# Patient Record
Sex: MALE | Race: WHITE | HISPANIC OR LATINO | Employment: FULL TIME | URBAN - METROPOLITAN AREA
[De-identification: names, ages, dates, MRNs, and addresses within clinical notes are randomized per-mention and may not be internally consistent; named-entity substitution may affect disease eponyms.]

---

## 2022-10-05 ENCOUNTER — HOSPITAL ENCOUNTER (EMERGENCY)
Facility: HOSPITAL | Age: 37
Discharge: HOME | End: 2022-10-05
Attending: EMERGENCY MEDICINE | Admitting: EMERGENCY MEDICINE

## 2022-10-05 ENCOUNTER — APPOINTMENT (OUTPATIENT)
Dept: GENERAL RADIOLOGY | Facility: HOSPITAL | Age: 37
End: 2022-10-05

## 2022-10-05 VITALS
TEMPERATURE: 98.9 F | OXYGEN SATURATION: 99 % | WEIGHT: 212.96 LBS | BODY MASS INDEX: 34.23 KG/M2 | HEART RATE: 85 BPM | DIASTOLIC BLOOD PRESSURE: 85 MMHG | RESPIRATION RATE: 18 BRPM | SYSTOLIC BLOOD PRESSURE: 142 MMHG | HEIGHT: 66 IN

## 2022-10-05 DIAGNOSIS — M70.22 OLECRANON BURSITIS OF LEFT ELBOW: Primary | ICD-10-CM

## 2022-10-05 DIAGNOSIS — L03.114 CELLULITIS OF LEFT ELBOW: ICD-10-CM

## 2022-10-05 PROCEDURE — 99282 EMERGENCY DEPT VISIT SF MDM: CPT

## 2022-10-05 PROCEDURE — 73080 X-RAY EXAM OF ELBOW: CPT

## 2022-10-05 RX ORDER — SULFAMETHOXAZOLE AND TRIMETHOPRIM 800; 160 MG/1; MG/1
1 TABLET ORAL 2 TIMES DAILY
Qty: 20 TABLET | Refills: 0 | Status: SHIPPED | OUTPATIENT
Start: 2022-10-05

## 2022-10-06 NOTE — ED PROVIDER NOTES
Time: 9:10 PM EDT  Arrived by: private car  Chief Complaint: Left elbow swelling  History provided by: Patient  History is limited by: N/A     History of Present Illness:  Patient is a 37 y.o. year old male who presents to the emergency department with left elbow swelling.    Patient presents to the emergency department today with complaints of left elbow pain and swelling.  Patient states it was not swollen for approximately 4 days.  Patient states he is an  and gets multiple cuts and injuries to his elbows.  Patient states he noticed that the area started become swollen and he squeezed it and got some pustular drainage along with blood.  Patient states yesterday is when he squeezed the drainage out.  Patient states he has continued to have some mild swelling and pain with palpation of the left elbow.  Patient denies any difficulty with range of motion.  Patient does state he has a history of MRSA infections and was concerned that he may have an infection of the left elbow now.      History provided by:  Patient   used: No        Similar Symptoms Previously: Yes  Recently seen: No      Patient Care Team  Primary Care Provider: Provider, No Known    Past Medical History:     No Known Allergies  History reviewed. No pertinent past medical history.  History reviewed. No pertinent surgical history.  History reviewed. No pertinent family history.    Home Medications:  Prior to Admission medications    Not on File        Social History:   Social History     Tobacco Use   • Smoking status: Never Smoker   • Smokeless tobacco: Never Used   Substance Use Topics   • Alcohol use: Not Currently   • Drug use: Never     Recent travel: no     Review of Systems:  Review of Systems   Constitutional: Negative for chills and fever.   HENT: Negative for congestion and ear pain.    Eyes: Negative for pain.   Respiratory: Negative for cough and shortness of breath.    Cardiovascular: Negative for chest  "pain.   Gastrointestinal: Negative for abdominal pain, diarrhea, nausea and vomiting.   Genitourinary: Negative for dysuria and hematuria.   Musculoskeletal: Negative for myalgias.        Left elbow pain and swelling   Skin: Negative for rash.   Neurological: Negative for dizziness and headaches.        Physical Exam:  /85 (BP Location: Right arm, Patient Position: Sitting)   Pulse 85   Temp 98.9 °F (37.2 °C) (Oral)   Resp 18   Ht 167.6 cm (66\")   Wt 96.6 kg (212 lb 15.4 oz)   SpO2 99%   BMI 34.37 kg/m²     Physical Exam  Vitals and nursing note reviewed.   Constitutional:       General: He is not in acute distress.     Appearance: Normal appearance. He is normal weight. He is not ill-appearing, toxic-appearing or diaphoretic.   HENT:      Head: Normocephalic and atraumatic.      Nose: Nose normal.   Eyes:      Extraocular Movements: Extraocular movements intact.      Conjunctiva/sclera: Conjunctivae normal.      Pupils: Pupils are equal, round, and reactive to light.   Cardiovascular:      Rate and Rhythm: Normal rate and regular rhythm.      Heart sounds: Normal heart sounds.   Pulmonary:      Effort: Pulmonary effort is normal.   Musculoskeletal:         General: Normal range of motion.        Arms:       Cervical back: Normal range of motion and neck supple.      Comments: Normal range of motion of the left elbow.  There is abrasion as noted in the above diagram.  Tenderness palpation along the abrasion near the olecranon process.  There is mild swelling of the left elbow.  Neurovascularly intact.   Skin:     General: Skin is warm and dry.   Neurological:      General: No focal deficit present.      Mental Status: He is alert and oriented to person, place, and time.   Psychiatric:         Mood and Affect: Mood normal.         Behavior: Behavior normal.         Thought Content: Thought content normal.         Judgment: Judgment normal.                Medications in the Emergency " Department:  Medications - No data to display     Labs  Lab Results (last 24 hours)     ** No results found for the last 24 hours. **           Imaging:  XR Elbow 3+ View Left    Result Date: 10/5/2022  PROCEDURE: XR ELBOW 3+ VW LEFT  COMPARISON: None  INDICATIONS: elbow pain, swelling  FINDINGS:  There is no displaced fracture or dislocation.  There is no joint effusion.  No focal osseous abnormalities are seen.  Soft tissue prominence is noted at the dorsal aspect of the elbow.        1. No evidence for displaced fracture or dislocation.  There is no joint effusion. 2. Soft tissue prominence of the dorsal aspect of the elbow.  The findings may indicate changes of olecranon bursitis.      SELVIN WEST MD       Electronically Signed and Approved By: SELVIN WEST MD on 10/05/2022 at 21:30               Procedures:  Procedures    Progress                            Medical Decision Making:  MDM  Number of Diagnoses or Management Options  Diagnosis management comments: I have spoken with patient. I have explained the patient´s condition, diagnoses and treatment plan based on the information available to me at this time. I have answered the patient's questions and addressed any concerns. The patient has a good  understanding of the patient´s diagnosis, condition, and treatment plan as can be expected at this point. The vital signs have been stable. The patient´s condition is stable and appropriate for discharge from the emergency department.      The patient will pursue further outpatient evaluation with the primary care physician or other designated or consulting physician as outlined in the discharge instructions. They are agreeable to this plan of care and follow-up instructions have been explained in detail. The patient has received these instructions in written format and have expressed an understanding of the discharge instructions. The patient is aware that any significant change in condition or worsening of  symptoms should prompt an immediate return to this or the closest emergency department or call to 911.         Amount and/or Complexity of Data Reviewed  Tests in the radiology section of CPT®: reviewed and ordered  Independent visualization of images, tracings, or specimens: yes    Risk of Complications, Morbidity, and/or Mortality  Presenting problems: low  Diagnostic procedures: low  Management options: low    Patient Progress  Patient progress: stable       Final diagnoses:   Olecranon bursitis of left elbow   Cellulitis of left elbow        Disposition:  ED Disposition     ED Disposition   Discharge    Condition   Stable    Comment   --             This medical record created using voice recognition software.           Romie Guardado PA-C  10/05/22 [id number]

## 2022-10-06 NOTE — DISCHARGE INSTRUCTIONS
Please take full course of antibiotics as prescribed.  He may also take ibuprofen as needed to help with swelling of the area.  You may apply ice 15-20 minutes a time 4-5 times a day to improve the swelling as well.  If symptoms or not improved within 3 to 4 days please follow-up with your primary care provider.

## 2023-02-28 ENCOUNTER — HOSPITAL ENCOUNTER (EMERGENCY)
Facility: HOSPITAL | Age: 38
Discharge: HOME | End: 2023-02-28
Attending: EMERGENCY MEDICINE | Admitting: EMERGENCY MEDICINE
Payer: COMMERCIAL

## 2023-02-28 VITALS
SYSTOLIC BLOOD PRESSURE: 128 MMHG | HEART RATE: 76 BPM | OXYGEN SATURATION: 97 % | BODY MASS INDEX: 33.66 KG/M2 | WEIGHT: 209.44 LBS | HEIGHT: 66 IN | RESPIRATION RATE: 18 BRPM | DIASTOLIC BLOOD PRESSURE: 96 MMHG | TEMPERATURE: 97.5 F

## 2023-02-28 DIAGNOSIS — J10.1 INFLUENZA A: Primary | ICD-10-CM

## 2023-02-28 LAB
FLUAV AG NPH QL: POSITIVE
FLUBV AG NPH QL IA: NEGATIVE
SARS-COV-2 RNA RESP QL NAA+PROBE: NOT DETECTED

## 2023-02-28 PROCEDURE — U0004 COV-19 TEST NON-CDC HGH THRU: HCPCS | Performed by: EMERGENCY MEDICINE

## 2023-02-28 PROCEDURE — C9803 HOPD COVID-19 SPEC COLLECT: HCPCS | Performed by: EMERGENCY MEDICINE

## 2023-02-28 PROCEDURE — 99283 EMERGENCY DEPT VISIT LOW MDM: CPT

## 2023-02-28 PROCEDURE — 87804 INFLUENZA ASSAY W/OPTIC: CPT | Performed by: EMERGENCY MEDICINE
